# Patient Record
Sex: MALE | Race: OTHER | URBAN - METROPOLITAN AREA
[De-identification: names, ages, dates, MRNs, and addresses within clinical notes are randomized per-mention and may not be internally consistent; named-entity substitution may affect disease eponyms.]

---

## 2023-01-13 ENCOUNTER — EMERGENCY (EMERGENCY)
Facility: HOSPITAL | Age: 33
LOS: 0 days | Discharge: HOME | End: 2023-01-13
Attending: EMERGENCY MEDICINE | Admitting: EMERGENCY MEDICINE
Payer: COMMERCIAL

## 2023-01-13 VITALS
OXYGEN SATURATION: 96 % | RESPIRATION RATE: 18 BRPM | WEIGHT: 171.96 LBS | HEART RATE: 74 BPM | DIASTOLIC BLOOD PRESSURE: 89 MMHG | SYSTOLIC BLOOD PRESSURE: 157 MMHG | TEMPERATURE: 98 F

## 2023-01-13 DIAGNOSIS — K02.9 DENTAL CARIES, UNSPECIFIED: ICD-10-CM

## 2023-01-13 DIAGNOSIS — K08.89 OTHER SPECIFIED DISORDERS OF TEETH AND SUPPORTING STRUCTURES: ICD-10-CM

## 2023-01-13 PROCEDURE — 99284 EMERGENCY DEPT VISIT MOD MDM: CPT

## 2023-01-13 NOTE — ED PROVIDER NOTE - PHYSICAL EXAMINATION
Physical Exam    Vital Signs: I have reviewed the initial vital signs.  Constitutional: well-nourished, appears stated age, no acute distress  Dental: +pain to #19  Neuro: AOx3, No focal deficits noted

## 2023-01-13 NOTE — CONSULT NOTE ADULT - SUBJECTIVE AND OBJECTIVE BOX
Patient is a 33y old  Male who presents with a chief complaint of  dental pain lower left    HPI:   Patient had been in pain for past week    PAST MEDICAL & SURGICAL HISTORY:  No pertinent past medical history        ( -  ) heart valve replacement  ( - ) joint replacement  ( -  ) pregnancy    MEDICATIONS  (STANDING):    MEDICATIONS  (PRN):      Allergies      Intolerances        FAMILY HISTORY:      *Last Dental Visit: unknown    Vital Signs Last 24 Hrs  T(C): 36.6 (13 Jan 2023 16:01), Max: 36.6 (13 Jan 2023 16:01)  T(F): 97.9 (13 Jan 2023 16:01), Max: 97.9 (13 Jan 2023 16:01)  HR: 74 (13 Jan 2023 16:01) (74 - 74)  BP: 157/89 (13 Jan 2023 16:01) (157/89 - 157/89)  BP(mean): --  RR: 18 (13 Jan 2023 16:01) (18 - 18)  SpO2: 96% (13 Jan 2023 16:01) (96% - 96%)    Parameters below as of 13 Jan 2023 16:01  Patient On (Oxygen Delivery Method): room air        LABS:                  EOE:  TMJ (  - ) clicks                     ( -  ) pops                     (  - ) crepitus             Mandible <<FROM>>             Facial bones and MOM <<grossly intact>>             ( -  ) trismus             ( -  ) lymphadenopathy             ( -  ) swelling             ( -  ) asymmetry             ( -  ) palpation             ( -  ) dyspnea             ( -  ) dysphagia             ( -  ) loss of consciousness    IOE:  <<permanent >> dentition:  <<multiple carious teeth>>            hard/soft palate:  (  - ) palatal torus, <<No pathology noted>>           tongue/FOM <<No pathology noted>>           labial/buccal mucosa <<No pathology noted>>           ( +  ) percussion           ( + ) palpation           ( -  ) swelling            (  - ) abscess           (  - ) sinus tract  #19 gross caries  Dentition present: << y  >>  Mobility: << n >>  Caries: << y  >>       *DENTAL RADIOGRAPHS: 1Periapcal, #31 decay into pulp    RADIOLOGY & ADDITIONAL STUDIES:    *ASSESSMENT: Patient is a 33y old  Male who presents with a chief complaint of  dental pain lower left. Patient had been in pain for past week. 1Periapcal, #31 decay into pulp      *PLAN: Informed patient tooth has gross decay. Recommend patient follow up at primary dentist assigned by insurance.   Amoxicillin 500 mg tablet, quantity 21 tablets, 7 day supply. Sig: Take one tablet by oral route every 8 hours for 7 days.   Ibuprofen 600 mg tablet, quantity 20, 5 day supply. Sig: take 1 tablet by oral route every 6 hours as needed for pain. Take with food.       RECOMMENDATIONS:  1) Complete medication as prescribed  2) Dental F/U with outpatient dentist for comprehensive dental care.   3) If any difficulty swallowing/breathing, fever occur, return to ER.     Samy Allen DDS, [id number] Patient is a 33y old  Male who presents with a chief complaint of  dental pain lower left    HPI:   Patient had been in pain for past week    PAST MEDICAL & SURGICAL HISTORY:  No pertinent past medical history        ( -  ) heart valve replacement  ( - ) joint replacement  ( -  ) pregnancy    MEDICATIONS  (STANDING):    MEDICATIONS  (PRN):      Allergies      Intolerances        FAMILY HISTORY:      *Last Dental Visit: unknown    Vital Signs Last 24 Hrs  T(C): 36.6 (13 Jan 2023 16:01), Max: 36.6 (13 Jan 2023 16:01)  T(F): 97.9 (13 Jan 2023 16:01), Max: 97.9 (13 Jan 2023 16:01)  HR: 74 (13 Jan 2023 16:01) (74 - 74)  BP: 157/89 (13 Jan 2023 16:01) (157/89 - 157/89)  BP(mean): --  RR: 18 (13 Jan 2023 16:01) (18 - 18)  SpO2: 96% (13 Jan 2023 16:01) (96% - 96%)    Parameters below as of 13 Jan 2023 16:01  Patient On (Oxygen Delivery Method): room air        LABS:                  EOE:  TMJ (  - ) clicks                     ( -  ) pops                     (  - ) crepitus             Mandible <<FROM>>             Facial bones and MOM <<grossly intact>>             ( -  ) trismus             ( -  ) lymphadenopathy             ( -  ) swelling             ( -  ) asymmetry             ( -  ) palpation             ( -  ) dyspnea             ( -  ) dysphagia             ( -  ) loss of consciousness    IOE:  <<permanent >> dentition:  <<multiple carious teeth>>            hard/soft palate:  (  - ) palatal torus, <<No pathology noted>>           tongue/FOM <<No pathology noted>>           labial/buccal mucosa <<No pathology noted>>           ( +  ) percussion           ( + ) palpation           ( -  ) swelling            (  - ) abscess           (  - ) sinus tract  #19 gross caries noted  Dentition present: << y  >>  Mobility: << n >>  Caries: << y  >>       *DENTAL RADIOGRAPHS: 1Periapcal, #19 decay into pulp    RADIOLOGY & ADDITIONAL STUDIES:    *ASSESSMENT: Patient is a 33y old  Male who presents with a chief complaint of  dental pain lower left. Patient had been in pain for past week. #19 gross caries noted. 1Periapcal, #19 decay into pulp      *PLAN: Informed patient tooth has gross decay. Recommend patient follow up at primary dentist assigned by insurance.   Amoxicillin 500 mg tablet, quantity 21 tablets, 7 day supply. Sig: Take one tablet by oral route every 8 hours for 7 days.   Ibuprofen 600 mg tablet, quantity 20, 5 day supply. Sig: take 1 tablet by oral route every 6 hours as needed for pain. Take with food.       RECOMMENDATIONS:  1) Complete medication as prescribed  2) Dental F/U with outpatient dentist for comprehensive dental care.   3) If any difficulty swallowing/breathing, fever occur, return to ER.     Samy Allen DDS, [id number]

## 2023-01-13 NOTE — ED PROVIDER NOTE - ATTENDING APP SHARED VISIT CONTRIBUTION OF CARE
33-year-old male presenting with toothache for the past 4 days.  Exam shows decay and tenderness to percussion of tooth #19, no facial cellulitis, patent airway, no drooling or trismus.  Patient was transferred to dental clinic for further evaluation and care.